# Patient Record
Sex: FEMALE | Race: WHITE | NOT HISPANIC OR LATINO | Employment: FULL TIME | URBAN - METROPOLITAN AREA
[De-identification: names, ages, dates, MRNs, and addresses within clinical notes are randomized per-mention and may not be internally consistent; named-entity substitution may affect disease eponyms.]

---

## 2019-12-27 ENCOUNTER — OFFICE VISIT (OUTPATIENT)
Dept: UROLOGY | Facility: CLINIC | Age: 39
End: 2019-12-27
Payer: COMMERCIAL

## 2019-12-27 VITALS
DIASTOLIC BLOOD PRESSURE: 72 MMHG | HEIGHT: 65 IN | SYSTOLIC BLOOD PRESSURE: 107 MMHG | HEART RATE: 82 BPM | BODY MASS INDEX: 21.16 KG/M2 | WEIGHT: 127 LBS

## 2019-12-27 DIAGNOSIS — N39.0 CHRONIC UTI: Primary | ICD-10-CM

## 2019-12-27 PROCEDURE — 99203 OFFICE O/P NEW LOW 30 MIN: CPT | Performed by: PHYSICIAN ASSISTANT

## 2019-12-27 RX ORDER — NITROFURANTOIN 25; 75 MG/1; MG/1
CAPSULE ORAL
COMMUNITY
Start: 2019-12-17

## 2019-12-27 ASSESSMENT — MIFFLIN-ST. JEOR: SCORE: 1246.95

## 2019-12-27 NOTE — PROGRESS NOTES
CC: chronic UTI    HPI: It is a pleasure to see Ms. Colleen De Paz, a very pleasant 39 year old female who presents via self-referral for evaluation of chronic UTI. She states this has been an issue for the past 10 years. She has seen multiple urologists and has most recently been working with a naturopath provider. Regular urine cultures often return negative for infection, but [facility] testing has consistently returned positive. Most recent testing showed >100K Enterococcus faecalis and >100K E. Coli. She is currently being treated with long-term Macrobid 100 mg BID and levofloxacin solution bladder instillations which she administers via self-catheter twice daily. This regimen has helped to reduce her UTI symptoms. She now lives in [place] and is looking to establish care with urology locally in order to continue receiving these prescriptions. Notes that she was previously on low dose nitrofurantoin 100 mg once daily for 9 months which was not effective. She has also been on some form of bladder antibiotic solution instillation for the past 5 years. She voids normally on her own between her twice daily self catheterizations.     Last cystoscopy was close to 10 years ago at which time she was reportedly diagnosed with interstitial cystitis and trigonitis. Her naturopath has also diagnosed her with Lyme Disease and thinks that this may be contributing to some of her bladder symptoms. However, she has tried various herbal remedies for this which doesn't seem to help.     No history of kidney stones. Most recent upper tract imaging roughly 5 years ago.     When she has a UTI, symptoms include bladder pain, frequency, and urgency.     She denies gross hematuria, history of kidney stones, constipation, or urinary incontinence.     She has tried OAB medications which do not help. Takes a daily probiotic.       Past Medical History:   Diagnosis Date     Interstitial cystitis 2008    currently treated homeopathically      "Trigonitis      No past surgical history on file.  Current Outpatient Medications   Medication Sig Dispense Refill     amoxicillin (AMOXIL) 875 MG tablet Take  by mouth 2 times daily.       B Complex Vitamins (VITAMIN B COMPLEX PO) Take  by mouth.       Cholecalciferol (VITAMIN D) 2000 UNITS CAPS Take  by mouth.       GARLIC        HERBALS        Homeopathic Products (ENGYSTOL PO) Take  by mouth.       IVERMECTIN        Multiple Vitamin (MULTIVITAMINS PO) Take  by mouth.       nitroFURantoin macrocrystal-monohydrate (MACROBID) 100 MG capsule        NYSTATIN        Probiotic Product (PROBIOTIC PO) Take  by mouth.       UNABLE TO FIND MEDICATION NAME: Levofloxacin 30mg instills BID bladder       No Known Allergies    Family History: There is no h/o  carcinoma.  There is no h/o urolithiasis.    Social History: The patient does not smoke cigarettes, minimal EtOH and no illicit drug use.    ROS: A comprehensive 14 point ROS was obtained and was negative except for that outlined above in the HPI.    PHYSICAL EXAM:   Blood pressure 107/72, pulse 82, height 1.651 m (5' 5\"), weight 57.6 kg (127 lb).   Body mass index is 21.13 kg/m .  GENERAL: Well groomed/well developed/well nourished female in NAD.  HEENT: EOMI, AT, NC.  SKIN: Warm to touch, dry.  No visible rashes or lesions.  RESP: No increased respiratory effort.  MS: Full ROM in extremities.  PELVIC: Deferred for now.   NEURO: Alert and oriented x 3.  PSYCH: Normal mood and affect, pleasant and agreeable during interview and exam.    IMAGING: no recent    PVR: Postvoid residual urine volume as measured by ultrasound was 20 mL.    ASSESSMENT/PLAN:  Ms. Colleen De Paz is a 39 year old female with a history of chronic UTI, here to establish care. No records available to review, but patient reports that standard urine cultures often return negative; she has been diagnosed with E. Coli and Enterococcus bacteriuria via [facility] testing. Currently on a regimen of long term PO " Macrobid 100 mg BID and twice daily levofloxacin solution bladder instillations which she administers via self-catheter twice daily, prescribed by outside provider. She hopes to continue receiving refills locally now that she lives in [place].   -Discussed with patient that since she has not had recent workup for these chronic infections, it would be recommended to update cystoscopy and upper tract imaging to ensure no correctable nidus for infections. She verbalizes agreement and understanding.  -Schedule renal ultrasound next available (discussed CT as alternative, but will start with ultrasound for now).  -Schedule cystoscopy with next available female urologist.   -Continue daily probiotic.     If the above are normal, will consider refilling the above prescriptions. However, we did discuss that Macrobid 100 mg BID is more than what is recommended for long term UTI prevention - ideally, she would use 50 or 100 mg once daily (though she states that she has tried this lower dose and it is ineffective). In addition, the levofloxacin solution is expensive - could consider switch to gentamicin solution (both bacteria are sensitive per most recent culture result that patient hand-carried with her). May also want to consider referral to ID to assist with management of these infections.    I have enjoyed participating in the medical care of this very pleasant patient.  Please don't hesitate to contact me with any questions or concerns.     Agustina Carpio PA-C  Department of Urology

## 2019-12-27 NOTE — NURSING NOTE
Colleen De Paz's goals for this visit include:   Chief Complaint   Patient presents with     UTI     Recurring UTI x 10 years. Has been seeing independent NP for UTI's.        She requests these members of her care team be copied on today's visit information: no    PCP: Hakeem Noble    Referring Provider:  No referring provider defined for this encounter.    There were no vitals taken for this visit.    Do you need any medication refills at today's visit? No    PVR: 20mL    Lona Cazares LPN

## 2019-12-31 ENCOUNTER — MYC MEDICAL ADVICE (OUTPATIENT)
Dept: UROLOGY | Facility: CLINIC | Age: 39
End: 2019-12-31

## 2019-12-31 DIAGNOSIS — N39.0 CHRONIC UTI: Primary | ICD-10-CM

## 2020-01-02 NOTE — TELEPHONE ENCOUNTER
Gentamicin bladder irrigation ordered per Agustina Carpio PA-C. Prescription sent to [facility] pharmacy. Per my chart message, patient also requesting refill of daily macrobid. Message sent to Agustina Carpio PA-C regarding macrobid request. My chart message sent to patient.    Jaymie Chanel RN, BSN

## 2020-01-03 RX ORDER — NITROFURANTOIN 25; 75 MG/1; MG/1
100 CAPSULE ORAL AT BEDTIME
Qty: 30 CAPSULE | Refills: 3 | Status: SHIPPED | OUTPATIENT
Start: 2020-01-03

## 2020-03-02 ENCOUNTER — HEALTH MAINTENANCE LETTER (OUTPATIENT)
Age: 40
End: 2020-03-02

## 2020-12-03 ENCOUNTER — THERAPY VISIT (OUTPATIENT)
Dept: PHYSICAL THERAPY | Facility: CLINIC | Age: 40
End: 2020-12-03
Payer: COMMERCIAL

## 2020-12-03 DIAGNOSIS — M54.50 ACUTE LEFT-SIDED LOW BACK PAIN WITHOUT SCIATICA: ICD-10-CM

## 2020-12-03 PROCEDURE — 97110 THERAPEUTIC EXERCISES: CPT | Mod: GP | Performed by: PHYSICAL THERAPIST

## 2020-12-03 PROCEDURE — 97112 NEUROMUSCULAR REEDUCATION: CPT | Mod: GP | Performed by: PHYSICAL THERAPIST

## 2020-12-03 PROCEDURE — 97161 PT EVAL LOW COMPLEX 20 MIN: CPT | Mod: GP | Performed by: PHYSICAL THERAPIST

## 2020-12-03 NOTE — PROGRESS NOTES
[facility] Initial Evaluation  Subjective:    Patient Health History  Colleen De Paz being seen for Low back pain.     Problem began: 11/30/2020.   Problem occurred: Gradually worsening low back pain over the past 6 months   Pain is reported as 8/10 on pain scale.  General health as reported by patient is good.  Pertinent medical history includes: none.   Red flags:  None as reported by patient.  Medical allergies: none.   Surgeries include:  None.    Current medications: antibiotics.    Current occupation is [employer].   Primary job tasks include:  Prolonged sitting.                  Therapist Generated HPI Evaluation  Problem details: Pt presents to clinic with complaints of L sided low back pain gradually worsening over the past 2 months. Pt noticed increased pain on 11/30/2020 for unknown reasons. Pt has history of low back pain >10 years ago, responding well to PT. Pt has attempted previous PT exercises and has noticed slight increase in pain. Pt having most pain with prolonged sitting at this time. .         Type of problem:  Lumbar.    This is a new condition.  Condition occurred with:  Insidious onset.  Where condition occurred: for unknown reasons.  Patient reports pain:  Lumbar spine left and lower lumbar spine.  Pain is described as aching and is constant.  Pain radiates to:  Thigh left. Pain is worse in the A.M..  Since onset symptoms are gradually worsening.  Associated symptoms:  Loss of motion/stiffness. Symptoms are exacerbated by sitting, lifting, bending and certain positions  and relieved by activity/movement, heat and NSAID's.  Imaging testing: none.  Previous treatment includes chiropractic. There was mild improvement following previous treatment.  Restrictions due to condition include:  Working in normal job without restrictions.  Barriers include:  None as reported by patient.                        Objective:        Flexibility/Screens:       Lower Extremity:  Decreased  left lower extremity flexibility:Hamstrings    Decreased right lower extremity flexibility:  Hamstrings               Lumbar/SI Evaluation  ROM:    AROM Lumbar:   Flexion:            To floor +hamstring tightness  Ext:                    75% slight pain   Side Bend:        Left:  WNL     Right:  75% +pull L  Rotation:           Left:  75%    Right:  75%  Side Glide:        Left:     Right:         Strength: glute max 4/5 L 4+/5 R  Lumbar Myotomes:  normal                Lumbar Dermtomes:  normal                Neural Tension/Mobility:      Left side:SLR; Femoral Nerve or Slump  negative.     Right side:   Femoral Nerve; Slump or SLR  negative.   Lumbar Palpation:    Tenderness present at Left:    Quadratus Lumborum; Erector Spinae and PSIS    Tenderness not present at Right:  Quadratus Lumborum; Erector Spinae or PSIS    Lumbar Provocation:      Left negative with:  PROM hip    Right negative with:  PROM hip  Spinal Segmental Conclusions:     Level: Hypo noted at L5 and L4                                                   General     ROS    Assessment/Plan:    Patient is a 40 year old female with lumbar complaints.    Patient has the following significant findings with corresponding treatment plan.                Diagnosis 1:  L sided low back pain  Pain -  hot/cold therapy, manual therapy, self management, education and home program  Decreased ROM/flexibility - manual therapy, therapeutic exercise, therapeutic activity and home program  Decreased strength - therapeutic exercise and therapeutic activities  Impaired muscle performance - neuro re-education  Decreased function - therapeutic activities and home program  Impaired posture - neuro re-education and therapeutic activities    Therapy Evaluation Codes:   1) History comprised of:   Personal factors that impact the plan of care:      None.    Comorbidity factors that impact the plan of care are:      None.     Medications impacting care: None.  2) Examination of  Body Systems comprised of:   Body structures and functions that impact the plan of care:      Lumbar spine.   Activity limitations that impact the plan of care are:      Bending, Lifting and Sitting.  3) Clinical presentation characteristics are:   Stable/Uncomplicated.  4) Decision-Making    Low complexity using standardized patient assessment instrument and/or measureable assessment of functional outcome.  Cumulative Therapy Evaluation is: Low complexity.    Previous and current functional limitations:  (See Goal Flow Sheet for this information)    Short term and Long term goals: (See Goal Flow Sheet for this information)     Communication ability:  Patient appears to be able to clearly communicate and understand verbal and written communication and follow directions correctly.  Treatment Explanation - The following has been discussed with the patient:   RX ordered/plan of care  Anticipated outcomes  Possible risks and side effects  This patient would benefit from PT intervention to resume normal activities.   Rehab potential is good.    Frequency:  1 X week, once daily  Duration:  for 8 weeks  Discharge Plan:  Achieve all LTG.  Independent in home treatment program.  Return to previous functional level by discharge.  Reach maximal therapeutic benefit.    Please refer to the daily flowsheet for treatment today, total treatment time and time spent performing 1:1 timed codes.

## 2020-12-10 ENCOUNTER — THERAPY VISIT (OUTPATIENT)
Dept: PHYSICAL THERAPY | Facility: CLINIC | Age: 40
End: 2020-12-10
Payer: COMMERCIAL

## 2020-12-10 DIAGNOSIS — M54.50 ACUTE LEFT-SIDED LOW BACK PAIN WITHOUT SCIATICA: ICD-10-CM

## 2020-12-10 PROCEDURE — 97110 THERAPEUTIC EXERCISES: CPT | Mod: GT | Performed by: PHYSICAL THERAPIST

## 2020-12-10 PROCEDURE — 97112 NEUROMUSCULAR REEDUCATION: CPT | Mod: GT | Performed by: PHYSICAL THERAPIST

## 2020-12-14 ENCOUNTER — HEALTH MAINTENANCE LETTER (OUTPATIENT)
Age: 40
End: 2020-12-14

## 2021-02-09 PROBLEM — M54.50 ACUTE LEFT-SIDED LOW BACK PAIN WITHOUT SCIATICA: Status: RESOLVED | Noted: 2020-12-03 | Resolved: 2021-02-09

## 2021-02-09 NOTE — PROGRESS NOTES
Discharge Note    Progress reporting period is from initial evaluation date (please see noted date below) to Dec 10, 2020.  Linked Episodes   Type: Episode: Status: Noted: Resolved: Last update: Updated by:   PHYSICAL THERAPY Low back 12/3/2020 Active 12/3/2020  12/10/2020  8:40 AM Drew Jenkins, PT      Comments:       Colleen failed to follow up and current status is unknown.  Please see information below for last relevant information on current status.  Patient seen for 2 visits.    SUBJECTIVE  Subjective changes noted by patient:  Colleen reports feeling significant improvement in overall low back pain this week. No longer complains of pain with sitting. Has not been as consistent with HEP the past 2 days due to having cold. Overall pt reports functioning at 90% at this time.   .  Current pain level is 2/10.     Previous pain level was  8/10.   Changes in function:  Yes (See Goal flowsheet attached for changes in current functional level)  Adverse reaction to treatment or activity: None    OBJECTIVE  Changes noted in objective findings: Lumbar AROM: flexion to floor -pain, extension WNL -pain, bilateral SB WNL -pain, bilateral rotation WNL -pain     ASSESSMENT/PLAN  Diagnosis: Low back pain   Updated problem list and treatment plan:   Pain - HEP  Decreased ROM/flexibility - HEP  Decreased strength - HEP  Impaired muscle performance - HEP  STG/LTGs have been met or progress has been made towards goals:  Yes, please see goal flowsheet for most current information  Assessment of Progress: current status is unknown.    Last current status: Pt is progressing well   Self Management Plans:  HEP  I have re-evaluated this patient and find that the nature, scope, duration and intensity of the therapy is appropriate for the medical condition of the patient.  Colleen continues to require the following intervention to meet STG and LTG's:  HEP.    Recommendations:  Discharge with current home program.  Patient to follow up with MD  as needed.    Please refer to the daily flowsheet for treatment today, total treatment time and time spent performing 1:1 timed codes.

## 2021-04-18 ENCOUNTER — HEALTH MAINTENANCE LETTER (OUTPATIENT)
Age: 41
End: 2021-04-18

## 2021-10-02 ENCOUNTER — HEALTH MAINTENANCE LETTER (OUTPATIENT)
Age: 41
End: 2021-10-02

## 2022-01-14 ENCOUNTER — APPOINTMENT (OUTPATIENT)
Dept: URBAN - METROPOLITAN AREA CLINIC 259 | Age: 42
Setting detail: DERMATOLOGY
End: 2022-01-14

## 2022-01-14 ENCOUNTER — APPOINTMENT (OUTPATIENT)
Dept: URBAN - METROPOLITAN AREA CLINIC 259 | Age: 42
Setting detail: DERMATOLOGY
End: 2022-01-18

## 2022-01-14 DIAGNOSIS — Z41.9 ENCOUNTER FOR PROCEDURE FOR PURPOSES OTHER THAN REMEDYING HEALTH STATE, UNSPECIFIED: ICD-10-CM

## 2022-01-14 DIAGNOSIS — L21.8 OTHER SEBORRHEIC DERMATITIS: ICD-10-CM

## 2022-01-14 PROCEDURE — OTHER COUNSELING: OTHER

## 2022-01-14 PROCEDURE — OTHER BOTOX (U OR CC): OTHER

## 2022-01-14 PROCEDURE — OTHER MEDICATION COUNSELING: OTHER

## 2022-01-14 PROCEDURE — OTHER PRESCRIPTION: OTHER

## 2022-01-14 PROCEDURE — OTHER BOTOX: OTHER

## 2022-01-14 PROCEDURE — 99214 OFFICE O/P EST MOD 30 MIN: CPT

## 2022-01-14 RX ORDER — DESONIDE 0.5 MG/G
0.05% CREAM TOPICAL QD
Qty: 60 | Refills: 3 | Status: ERX | COMMUNITY
Start: 2022-01-14

## 2022-01-14 RX ORDER — KETOCONAZOLE 20 MG/G
2% CREAM TOPICAL QD
Qty: 30 | Refills: 3 | Status: ERX | COMMUNITY
Start: 2022-01-14

## 2022-01-14 ASSESSMENT — LOCATION DETAILED DESCRIPTION DERM
LOCATION DETAILED: LEFT SUPERIOR LATERAL FOREHEAD
LOCATION DETAILED: LEFT INFERIOR TEMPLE
LOCATION DETAILED: LEFT NASAL SIDEWALL
LOCATION DETAILED: LEFT SUPERIOR OCCIPITAL SCALP
LOCATION DETAILED: RIGHT INFERIOR TEMPLE
LOCATION DETAILED: LEFT SUPERIOR FOREHEAD
LOCATION DETAILED: RIGHT SUPERIOR MEDIAL FOREHEAD
LOCATION DETAILED: LEFT SUPERIOR CENTRAL MALAR CHEEK
LOCATION DETAILED: RIGHT SUPERIOR CENTRAL MALAR CHEEK
LOCATION DETAILED: LEFT SUPERIOR LATERAL MALAR CHEEK
LOCATION DETAILED: RIGHT NASAL SIDEWALL
LOCATION DETAILED: RIGHT SUPERIOR FOREHEAD
LOCATION DETAILED: RIGHT SUPERIOR LATERAL MALAR CHEEK
LOCATION DETAILED: LEFT INFERIOR MEDIAL FOREHEAD
LOCATION DETAILED: RIGHT SUPERIOR MEDIAL FOREHEAD
LOCATION DETAILED: GLABELLA
LOCATION DETAILED: LEFT SUPERIOR MEDIAL FOREHEAD

## 2022-01-14 ASSESSMENT — LOCATION SIMPLE DESCRIPTION DERM
LOCATION SIMPLE: RIGHT FOREHEAD
LOCATION SIMPLE: LEFT NOSE
LOCATION SIMPLE: LEFT FOREHEAD
LOCATION SIMPLE: LEFT CHEEK
LOCATION SIMPLE: RIGHT TEMPLE
LOCATION SIMPLE: LEFT OCCIPITAL SCALP
LOCATION SIMPLE: LEFT TEMPLE
LOCATION SIMPLE: RIGHT NOSE
LOCATION SIMPLE: GLABELLA
LOCATION SIMPLE: RIGHT FOREHEAD
LOCATION SIMPLE: RIGHT CHEEK

## 2022-01-14 ASSESSMENT — LOCATION ZONE DERM
LOCATION ZONE: SCALP
LOCATION ZONE: NOSE
LOCATION ZONE: FACE
LOCATION ZONE: FACE

## 2022-01-14 NOTE — PROCEDURE: BOTOX
Map Statement: Please see attached map for locations and injection amounts.
Post-Care Instructions: Patient instructed to not lie down for 4 hours and limit physical activity for 24 hours.
Detail Level: Detailed
Price (Use Numbers Only, No Special Characters Or $): 0.0
Consent: Written consent obtained. Risks include but not limited to lid/brow ptosis, bruising, swelling, diplopia, temporary effect, incomplete chemical denervation.
Total Units: 33

## 2022-01-14 NOTE — PROCEDURE: MEDICATION COUNSELING
Xelpreethiz Pregnancy And Lactation Text: This medication is Pregnancy Category D and is not considered safe during pregnancy.  The risk during breast feeding is also uncertain.

## 2022-01-14 NOTE — PROCEDURE: MEDICATION COUNSELING
Fall, initial encounter Colchicine Counseling:  Patient counseled regarding adverse effects including but not limited to stomach upset (nausea, vomiting, stomach pain, or diarrhea).  Patient instructed to limit alcohol consumption while taking this medication.  Colchicine may reduce blood counts especially with prolonged use.  The patient understands that monitoring of kidney function and blood counts may be required, especially at baseline. The patient verbalized understanding of the proper use and possible adverse effects of colchicine.  All of the patient's questions and concerns were addressed.

## 2022-01-14 NOTE — HPI: COSMETIC (BOTOX)
Have You Had Botox Before?: has not had botox
Additional History: Patient has had xeoimum before. Patient presents for Botox consultation and Botox injections if time allows.

## 2022-01-14 NOTE — PROCEDURE: BOTOX (U OR CC)
Masseter Units: 0
Document As Units Or Cc?: units
Quantity Per Injection Site (Units Or Cc): 1 U
Quantity Per Injection Site (Units Or Cc): 2 U
Quantity Per Injection Site (Units Or Cc): 3 U
Forehead Units/Cc: 10
Glabellar Complex Units: 9
Post-Care Instructions: Patient instructed to not lie down for 4 hours and limit physical activity for 24 hours.
Detail Level: Detailed
Dilution (U/0.1 Cc): 4
Nasal Root Units/Cc: 2
Price (Use Numbers Only, No Special Characters Or $): 336.6
Length Of Topical Anesthesia Application (Optional): 15 minutes
Including Pricing Information In The Note: No
Consent: Written consent obtained. Risks include but not limited to lid/brow ptosis, bruising, swelling, diplopia, temporary effect, incomplete chemical denervation.
Periorbital Skin Units/Cc: 12
Topical Anesthesia?: 20% benzocaine, 8% lidocaine, 4% tetracaine

## 2022-02-18 ENCOUNTER — APPOINTMENT (OUTPATIENT)
Dept: URBAN - METROPOLITAN AREA CLINIC 259 | Age: 42
Setting detail: DERMATOLOGY
End: 2022-02-22

## 2022-02-18 VITALS — WEIGHT: 128 LBS | RESPIRATION RATE: 12 BRPM | HEIGHT: 65 IN

## 2022-02-18 DIAGNOSIS — Z41.9 ENCOUNTER FOR PROCEDURE FOR PURPOSES OTHER THAN REMEDYING HEALTH STATE, UNSPECIFIED: ICD-10-CM

## 2022-02-18 PROCEDURE — OTHER BOTOX: OTHER

## 2022-02-18 PROCEDURE — OTHER MIPS QUALITY: OTHER

## 2022-02-18 PROCEDURE — OTHER OTHER: OTHER

## 2022-02-18 ASSESSMENT — LOCATION DETAILED DESCRIPTION DERM
LOCATION DETAILED: GLABELLA
LOCATION DETAILED: LEFT CENTRAL EYEBROW
LOCATION DETAILED: RIGHT MEDIAL EYEBROW

## 2022-02-18 ASSESSMENT — LOCATION SIMPLE DESCRIPTION DERM
LOCATION SIMPLE: GLABELLA
LOCATION SIMPLE: RIGHT EYEBROW
LOCATION SIMPLE: LEFT EYEBROW

## 2022-02-18 ASSESSMENT — LOCATION ZONE DERM: LOCATION ZONE: FACE

## 2022-02-18 NOTE — PROCEDURE: BOTOX
Post-Care Instructions: Patient instructed to not lie down for 4 hours and limit physical activity for 24 hours.
Consent: Written consent obtained. Risks include but not limited to lid/brow ptosis, bruising, swelling, diplopia, temporary effect, incomplete chemical denervation.
Total Units: 16
Detail Level: Detailed
Map Statement: Please see attached map for locations and injection amounts.

## 2022-02-18 NOTE — PROCEDURE: OTHER
Note Text (......Xxx Chief Complaint.): This diagnosis correlates with the
Other (Free Text): complementary treatment today due to no visible results from previous treatment.
Detail Level: Zone
Render Risk Assessment In Note?: no

## 2022-05-14 ENCOUNTER — HEALTH MAINTENANCE LETTER (OUTPATIENT)
Age: 42
End: 2022-05-14

## 2022-09-03 ENCOUNTER — HEALTH MAINTENANCE LETTER (OUTPATIENT)
Age: 42
End: 2022-09-03

## 2023-06-03 ENCOUNTER — HEALTH MAINTENANCE LETTER (OUTPATIENT)
Age: 43
End: 2023-06-03

## 2024-02-17 ENCOUNTER — HEALTH MAINTENANCE LETTER (OUTPATIENT)
Age: 44
End: 2024-02-17